# Patient Record
Sex: FEMALE | Race: WHITE | URBAN - NONMETROPOLITAN AREA
[De-identification: names, ages, dates, MRNs, and addresses within clinical notes are randomized per-mention and may not be internally consistent; named-entity substitution may affect disease eponyms.]

---

## 2022-03-10 ENCOUNTER — OFFICE VISIT (OUTPATIENT)
Dept: URBAN - NONMETROPOLITAN AREA CLINIC 7 | Facility: CLINIC | Age: 62
End: 2022-03-10

## 2022-03-10 DIAGNOSIS — H43.811 VITREOUS DEGENERATION, RIGHT EYE: Primary | ICD-10-CM

## 2022-03-10 DIAGNOSIS — H04.123 TEAR FILM INSUFFICIENCY OF BILATERAL LACRIMAL GLANDS: ICD-10-CM

## 2022-03-10 DIAGNOSIS — H25.13 AGE-RELATED NUCLEAR CATARACT, BILATERAL: ICD-10-CM

## 2022-03-10 PROCEDURE — 99203 OFFICE O/P NEW LOW 30 MIN: CPT | Performed by: OPTOMETRIST

## 2022-03-10 RX ORDER — CARBOXYMETHYLCELLULOSE SODIUM, GLYCERIN 5; 9 MG/ML; MG/ML
SOLUTION/ DROPS OPHTHALMIC
Qty: 1 | Refills: 0 | Status: ACTIVE
Start: 2022-03-10

## 2022-03-10 ASSESSMENT — INTRAOCULAR PRESSURE
OS: 18
OD: 18

## 2022-03-10 NOTE — IMPRESSION/PLAN
Impression: Age-related nuclear cataract, bilateral: H25.13. Plan: Treatment is not currently recommended. I encouraged the patient to continue to monitor for vision changes, and contact us should diminution of vision occurs. Continue to monitor. Patient education provided.

## 2022-03-10 NOTE — IMPRESSION/PLAN
Impression: Vitreous degeneration, right eye: H43.811. Plan: Patient education regarding symptoms and risks of retinal holes, tears, and detachment. The patient was instructed to contact office immediately should symptoms occur. Continue to monitor.

## 2022-05-04 ENCOUNTER — OFFICE VISIT (OUTPATIENT)
Dept: URBAN - NONMETROPOLITAN AREA CLINIC 7 | Facility: CLINIC | Age: 62
End: 2022-05-04

## 2022-05-04 DIAGNOSIS — H04.123 TEAR FILM INSUFFICIENCY OF BILATERAL LACRIMAL GLANDS: ICD-10-CM

## 2022-05-04 DIAGNOSIS — H43.811 VITREOUS DEGENERATION, RIGHT EYE: Primary | ICD-10-CM

## 2022-05-04 DIAGNOSIS — H25.13 AGE-RELATED NUCLEAR CATARACT, BILATERAL: ICD-10-CM

## 2022-05-04 PROCEDURE — 92134 CPTRZ OPH DX IMG PST SGM RTA: CPT | Performed by: OPTOMETRIST

## 2022-05-04 PROCEDURE — 99213 OFFICE O/P EST LOW 20 MIN: CPT | Performed by: OPTOMETRIST

## 2022-05-04 ASSESSMENT — INTRAOCULAR PRESSURE
OD: 19
OS: 18

## 2022-05-04 NOTE — IMPRESSION/PLAN
Impression: Vitreous degeneration, right eye: H43.811. Plan: Appears stable. Patient education regarding symptoms and risks of retinal holes, tears, and detachment. The patient was instructed to contact office immediately should symptoms occur. Continue to monitor. MAC OCT done today.

## 2022-08-04 ENCOUNTER — OFFICE VISIT (OUTPATIENT)
Dept: URBAN - NONMETROPOLITAN AREA CLINIC 7 | Facility: CLINIC | Age: 62
End: 2022-08-04

## 2022-08-04 DIAGNOSIS — H16.223 KERATOCONJUNCTIVITIS SICCA, BILATERAL: Primary | ICD-10-CM

## 2022-08-04 PROCEDURE — 99213 OFFICE O/P EST LOW 20 MIN: CPT | Performed by: OPTOMETRIST

## 2022-08-04 NOTE — IMPRESSION/PLAN
Impression: Keratoconjunctivitis sicca, bilateral: O69.610. Plan: Worse today. Reinforced compliance to AT's QID, gel QHS. Patient states that she does not want to receive care for eyes anymore despite risks to eyes/vision. She will contact our office should she have any further eye related problems or issues. Patient education on risk to loss of vision.